# Patient Record
Sex: FEMALE | Race: OTHER | URBAN - METROPOLITAN AREA
[De-identification: names, ages, dates, MRNs, and addresses within clinical notes are randomized per-mention and may not be internally consistent; named-entity substitution may affect disease eponyms.]

---

## 2018-01-03 ENCOUNTER — TELEPHONE (OUTPATIENT)
Dept: FAMILY MEDICINE CLINIC | Facility: CLINIC | Age: 18
End: 2018-01-03

## 2018-01-03 NOTE — TELEPHONE ENCOUNTER
Father informed, asked for MMR and Tdap dates given  Father states he needs immunization dates entered on form, states he will drop off tomorrow.   Father given recommendations, explained that pt should have an appt to review what is being recommended,  Fat

## 2018-01-03 NOTE — TELEPHONE ENCOUNTER
I would recommend the patient have a second meningitis vaccine since her first 1 was given under age 10.   Also please find out if patient had the chickenpox disease as I do not see checkpox vaccine–if she has not had the chickenpox and I would recommend th

## 2018-01-03 NOTE — TELEPHONE ENCOUNTER
Is wondering what vaccines pt has had. Pt attends [employer] and wants to make sure she is up to date with all vaccines.

## 2018-01-04 NOTE — TELEPHONE ENCOUNTER
Ortega dropped off form, filled out what I could, patient is due for a second meveo in order for cristal to sign completed form.  Physical scheduled for Monday 1/8 to get 2nd menveo to get form filled out Expressed understanding     Your appointments     Date &

## 2018-01-04 NOTE — TELEPHONE ENCOUNTER
Spoke with Father (Ortega). He states the form is only for immunization history. Not a school physical. He will drop it off today for cristal to review.

## 2018-01-04 NOTE — TELEPHONE ENCOUNTER
Agree- patient  Likely needs an appointment -if her form is purely just her immunizations–it can be filled out. However, if it is a \"school physical\" she will need an appointment. Last physical was in 2015, last visit was in April 2016 for tonsillitis.

## 2018-01-08 ENCOUNTER — APPOINTMENT (OUTPATIENT)
Dept: LAB | Age: 18
End: 2018-01-08
Attending: NURSE PRACTITIONER
Payer: COMMERCIAL

## 2018-01-08 ENCOUNTER — OFFICE VISIT (OUTPATIENT)
Dept: FAMILY MEDICINE CLINIC | Facility: CLINIC | Age: 18
End: 2018-01-08

## 2018-01-08 VITALS
HEIGHT: 62.5 IN | RESPIRATION RATE: 14 BRPM | HEART RATE: 72 BPM | WEIGHT: 157.38 LBS | TEMPERATURE: 98 F | BODY MASS INDEX: 28.24 KG/M2 | DIASTOLIC BLOOD PRESSURE: 62 MMHG | SYSTOLIC BLOOD PRESSURE: 126 MMHG

## 2018-01-08 DIAGNOSIS — N94.6 DYSMENORRHEA: ICD-10-CM

## 2018-01-08 DIAGNOSIS — Z11.3 SCREENING FOR STD (SEXUALLY TRANSMITTED DISEASE): ICD-10-CM

## 2018-01-08 DIAGNOSIS — Z00.00 ENCOUNTER FOR HEALTH MAINTENANCE EXAMINATION IN ADULT: Primary | ICD-10-CM

## 2018-01-08 DIAGNOSIS — Z00.00 ENCOUNTER FOR HEALTH MAINTENANCE EXAMINATION IN ADULT: ICD-10-CM

## 2018-01-08 PROCEDURE — 87491 CHLMYD TRACH DNA AMP PROBE: CPT | Performed by: NURSE PRACTITIONER

## 2018-01-08 PROCEDURE — 99395 PREV VISIT EST AGE 18-39: CPT | Performed by: NURSE PRACTITIONER

## 2018-01-08 PROCEDURE — 86787 VARICELLA-ZOSTER ANTIBODY: CPT | Performed by: NURSE PRACTITIONER

## 2018-01-08 PROCEDURE — 87591 N.GONORRHOEAE DNA AMP PROB: CPT | Performed by: NURSE PRACTITIONER

## 2018-01-08 PROCEDURE — 36415 COLL VENOUS BLD VENIPUNCTURE: CPT | Performed by: NURSE PRACTITIONER

## 2018-01-08 RX ORDER — NORETHINDRONE ACETATE AND ETHINYL ESTRADIOL, AND FERROUS FUMARATE 1MG-20(24)
1 KIT ORAL DAILY
Qty: 28 CAPSULE | Refills: 12 | Status: SHIPPED | OUTPATIENT
Start: 2018-01-08 | End: 2019-05-01

## 2018-01-08 NOTE — PROGRESS NOTES
HPI:    Patient ID: Cindy Marquez is a 21year old female. HPI  Patient is here for a physical.  States she has a form for college to be filled out with her immunizations.   Required immunizations are complete, however patient does not have chickenpox v and ear canal normal.   Nose: Nose normal.   Mouth/Throat: Oropharynx is clear and moist and mucous membranes are normal. Normal dentition. No oropharyngeal exudate. Eyes: Conjunctivae and lids are normal. Pupils are equal, round, and reactive to light. ASSESSMENT/PLAN:   Screening for std (sexually transmitted disease)  Encounter for health maintenance examination in adult  (primary encounter diagnosis)  Dysmenorrhea      Orders Placed This Encounter      Varicella IgG ([employer] Titer)      Chlamydia/Gc

## 2018-01-08 NOTE — PATIENT INSTRUCTIONS
Recommend HPV vaccine-  Gardasil - 9   (series of 3 shots-  First, then in 2 months , then 6 months)-  Check on cost or if it is available at [facility] chickenpox titer today to check for immunity. - will hold form until results are back.     For [employer]

## 2018-01-09 LAB
C TRACH DNA SPEC QL NAA+PROBE: NEGATIVE
N GONORRHOEA DNA SPEC QL NAA+PROBE: NEGATIVE
VZV IGG SER IA-ACNC: POSITIVE

## 2018-01-10 ENCOUNTER — TELEPHONE (OUTPATIENT)
Dept: FAMILY MEDICINE CLINIC | Facility: CLINIC | Age: 18
End: 2018-01-10

## 2018-01-10 NOTE — TELEPHONE ENCOUNTER
----- Message from ARLENE Campbell sent at 1/9/2018 10:42 PM CST -----  Please notify patient that her chickenpox titer shows that she is immune to chickenpox–please give her a copy of  The lab report

## 2018-01-10 NOTE — TELEPHONE ENCOUNTER
----- Message from ARLENE Beltrán sent at 1/9/2018 10:40 PM CST -----  Please notify patient that gonorrhea and chlamydia is negative. Thank you.

## 2019-05-01 ENCOUNTER — OFFICE VISIT (OUTPATIENT)
Dept: FAMILY MEDICINE CLINIC | Facility: CLINIC | Age: 19
End: 2019-05-01
Payer: COMMERCIAL

## 2019-05-01 VITALS
TEMPERATURE: 97 F | SYSTOLIC BLOOD PRESSURE: 110 MMHG | DIASTOLIC BLOOD PRESSURE: 78 MMHG | HEART RATE: 80 BPM | WEIGHT: 190.25 LBS | RESPIRATION RATE: 16 BRPM | HEIGHT: 62.5 IN | BODY MASS INDEX: 34.14 KG/M2

## 2019-05-01 DIAGNOSIS — N94.6 DYSMENORRHEA: ICD-10-CM

## 2019-05-01 DIAGNOSIS — Z01.419 ENCOUNTER FOR GYNECOLOGICAL EXAMINATION: ICD-10-CM

## 2019-05-01 DIAGNOSIS — Z00.00 ENCOUNTER FOR HEALTH MAINTENANCE EXAMINATION IN ADULT: Primary | ICD-10-CM

## 2019-05-01 PROCEDURE — 88175 CYTOPATH C/V AUTO FLUID REDO: CPT | Performed by: NURSE PRACTITIONER

## 2019-05-01 PROCEDURE — 99395 PREV VISIT EST AGE 18-39: CPT | Performed by: NURSE PRACTITIONER

## 2019-05-01 RX ORDER — NORETHINDRONE ACETATE AND ETHINYL ESTRADIOL 1MG-20(21)
1 KIT ORAL DAILY
Qty: 3 PACKAGE | Refills: 12 | Status: SHIPPED | OUTPATIENT
Start: 2019-05-01 | End: 2020-03-25

## 2019-05-01 NOTE — PATIENT INSTRUCTIONS
Pap smear obtained today–results to be back within a week and will notify you. Recommend Gardasil 9 (HPV vaccine) series of 3 given first then 2 months in 6 months.     Recommend fasting blood work to [employer]  such as

## 2019-05-01 NOTE — PROGRESS NOTES
HPI:    Patient ID: Isidra Hatfield is a 22year old female.     HPI  Patient is here for Pap and physical.  Patient has never had a Pap smear in the past.  She denies any vaginal complaints–no itching, burning, discharge, etc.  She had STD testing last yea tracheal deviation present. No thyroid mass and no thyromegaly present. Cardiovascular: Normal rate, regular rhythm, S1 normal, S2 normal, normal heart sounds, intact distal pulses and normal pulses. No murmur heard.   Pulses:       Dorsalis pedis pulse thought content normal.   Nursing note and vitals reviewed.              ASSESSMENT/PLAN:   Encounter for health maintenance examination in adult  (primary encounter diagnosis)  Encounter for gynecological examination  Dysmenorrhea    Orders Placed This Enc

## 2019-05-06 ENCOUNTER — TELEPHONE (OUTPATIENT)
Dept: FAMILY MEDICINE CLINIC | Facility: CLINIC | Age: 19
End: 2019-05-06

## 2019-05-06 NOTE — TELEPHONE ENCOUNTER
----- Message from ESTELLE Dorman sent at 5/6/2019 12:01 PM CDT -----  Please notify patient that her Pap smear is within normal limits. Recommend annual physical, will discuss need for Pap at physical next year. Thank you.

## 2020-03-25 ENCOUNTER — TELEPHONE (OUTPATIENT)
Dept: FAMILY MEDICINE CLINIC | Facility: CLINIC | Age: 20
End: 2020-03-25

## 2020-03-25 PROCEDURE — 84439 ASSAY OF FREE THYROXINE: CPT | Performed by: FAMILY MEDICINE

## 2020-03-25 PROCEDURE — 80050 GENERAL HEALTH PANEL: CPT | Performed by: FAMILY MEDICINE

## 2020-03-25 PROCEDURE — 83550 IRON BINDING TEST: CPT | Performed by: FAMILY MEDICINE

## 2020-03-25 PROCEDURE — 83540 ASSAY OF IRON: CPT | Performed by: FAMILY MEDICINE

## 2020-03-25 NOTE — PATIENT INSTRUCTIONS
D/w pt reactive anxiety  D/w pt counceling- she can self refer to [address] [place] [address]    Pt given RX for long acting lexapro to take daily  And Xananx to take as needed-- I rec she take this evening to calm herself and help with sleep    Recheck 2-3 weeks  Pt

## 2020-03-25 NOTE — TELEPHONE ENCOUNTER
Patient states that she feels like she has shortness of breath, is not sure if it's due to her anxiety. Wants appointment for today.

## 2020-03-25 NOTE — PROGRESS NOTES
Kylah Miramontes is a 23year old female. Patient presents with: Anxiety: started Thursday Night      HPI:   Notes from phone call earlier today:  \"Patient feels like due to her anxiety she has been experiencing shortness of breath.  Having to take deep 34.45 kg/m². Current Outpatient Medications   Medication Sig Dispense Refill   • escitalopram (LEXAPRO) 10 MG Oral Tab Take 1 tablet (10 mg total) by mouth daily.  30 tablet 1   • ALPRAZolam (XANAX) 0.25 MG Oral Tab Take 1 tablet (0.25 mg total) by mo Future    2.  Amenorrhea  preg test negative, pt to monitor for next cycle  - URINE PREGNANCY TEST    Patient Instructions   D/w pt reactive anxiety  D/w pt counceling- she can self refer to [address] [place] [address]    Pt given RX for long acting lexapro to take da

## 2020-03-25 NOTE — TELEPHONE ENCOUNTER
Patient feels like due to her anxiety she has been experiencing shortness of breath. Having to take deep breaths to calm down. Has been feeling like this for four days and is worried it might become a panic attack.  The shortness of breath is also worsening

## 2020-03-26 ENCOUNTER — TELEPHONE (OUTPATIENT)
Dept: FAMILY MEDICINE CLINIC | Facility: CLINIC | Age: 20
End: 2020-03-26

## 2020-03-26 NOTE — TELEPHONE ENCOUNTER
----- Message from Cm Corado MD sent at 3/26/2020  7:43 AM CDT -----  Lab result reviewed  Chemistry panel normal  Thyroid function normal  Cbc-normal  Iron panel with borderlne deficiency- with normal iron, but elevated TIBC and low saturation --

## 2021-04-15 ENCOUNTER — OFFICE VISIT (OUTPATIENT)
Dept: FAMILY MEDICINE CLINIC | Facility: CLINIC | Age: 21
End: 2021-04-15
Payer: COMMERCIAL

## 2021-04-15 VITALS
HEART RATE: 98 BPM | DIASTOLIC BLOOD PRESSURE: 72 MMHG | SYSTOLIC BLOOD PRESSURE: 124 MMHG | RESPIRATION RATE: 14 BRPM | TEMPERATURE: 98 F | OXYGEN SATURATION: 98 %

## 2021-04-15 DIAGNOSIS — R05.9 COUGH: ICD-10-CM

## 2021-04-15 DIAGNOSIS — R09.89 RUNNY NOSE: Primary | ICD-10-CM

## 2021-04-15 DIAGNOSIS — J02.9 SORE THROAT: ICD-10-CM

## 2021-04-15 PROCEDURE — 3078F DIAST BP <80 MM HG: CPT | Performed by: NURSE PRACTITIONER

## 2021-04-15 PROCEDURE — 3074F SYST BP LT 130 MM HG: CPT | Performed by: NURSE PRACTITIONER

## 2021-04-15 PROCEDURE — 99213 OFFICE O/P EST LOW 20 MIN: CPT | Performed by: NURSE PRACTITIONER

## 2021-04-15 RX ORDER — BENZONATATE 100 MG/1
100 CAPSULE ORAL 3 TIMES DAILY PRN
Qty: 15 CAPSULE | Refills: 0 | Status: SHIPPED | OUTPATIENT
Start: 2021-04-15

## 2021-04-15 NOTE — PATIENT INSTRUCTIONS
covid swab today. Start zyrtec once a day. May take benadryl 50mg at night.   Tessalon pearles one every 8 hours as needed for cough  Fluticasone (flonase) two sprays each nostril once a day for a week then one spray each nostril daily for an additional w

## 2021-04-15 NOTE — PROGRESS NOTES
CHIEF COMPLAINT:   Patient presents with:  Sore Throat: Itchy throat that makes her cough.  Started around Sunday      HPI:   Elvie Diaz is a 19year old female who presents to [facility] today with complaints of itchy throat, started 04 tingling in face. See HPI    EXAM:   /72 (BP Location: Right arm, Patient Position: Sitting)   Pulse 98   Temp 98.3 °F (36.8 °C)   Resp 14   LMP 04/10/2021   SpO2 98%   No height and weight on file for this encounter.     GENERAL: well developed, well once a day. May take benadryl 50mg at night.   Tessalon pearles one every 8 hours as needed for cough  Fluticasone (flonase) two sprays each nostril once a day for a week then one spray each nostril daily for an additional week  Lukewarm salt water gargles

## 2021-04-16 ENCOUNTER — TELEPHONE (OUTPATIENT)
Dept: FAMILY MEDICINE CLINIC | Facility: CLINIC | Age: 21
End: 2021-04-16

## 2021-04-16 NOTE — TELEPHONE ENCOUNTER
----- Message from ESTELLE Marcano sent at 4/16/2021 10:41 AM CDT -----  Patient's covid test is negative. Continue treatment as outlined at visit, follow up if no resolution or symptoms worsen.

## 2021-08-30 ENCOUNTER — OFFICE VISIT (OUTPATIENT)
Dept: FAMILY MEDICINE CLINIC | Facility: CLINIC | Age: 21
End: 2021-08-30
Payer: COMMERCIAL

## 2021-08-30 ENCOUNTER — TELEPHONE (OUTPATIENT)
Dept: FAMILY MEDICINE CLINIC | Facility: CLINIC | Age: 21
End: 2021-08-30

## 2021-08-30 ENCOUNTER — HOSPITAL ENCOUNTER (OUTPATIENT)
Dept: GENERAL RADIOLOGY | Age: 21
Discharge: HOME | End: 2021-08-30
Attending: NURSE PRACTITIONER
Payer: COMMERCIAL

## 2021-08-30 VITALS
BODY MASS INDEX: 33.05 KG/M2 | TEMPERATURE: 98 F | HEART RATE: 118 BPM | HEIGHT: 62.5 IN | WEIGHT: 184.19 LBS | DIASTOLIC BLOOD PRESSURE: 76 MMHG | RESPIRATION RATE: 16 BRPM | SYSTOLIC BLOOD PRESSURE: 126 MMHG | OXYGEN SATURATION: 95 %

## 2021-08-30 DIAGNOSIS — R30.9 PAINFUL URINATION: Primary | ICD-10-CM

## 2021-08-30 DIAGNOSIS — N30.01 ACUTE CYSTITIS WITH HEMATURIA: ICD-10-CM

## 2021-08-30 DIAGNOSIS — R06.02 SHORTNESS OF BREATH: ICD-10-CM

## 2021-08-30 LAB
BILIRUB UR QL STRIP.AUTO: NEGATIVE
GLUCOSE (URINE DIPSTICK): 100 MG/DL
GLUCOSE UR STRIP.AUTO-MCNC: NEGATIVE MG/DL
KETONES (URINE DIPSTICK): NEGATIVE MG/DL
KETONES UR STRIP.AUTO-MCNC: NEGATIVE MG/DL
MULTISTIX LOT#: 5077 NUMERIC
NITRITE UR QL STRIP.AUTO: POSITIVE
NITRITE, URINE: POSITIVE
PH UR STRIP.AUTO: 5 [PH] (ref 5–8)
PH, URINE: 5.5 (ref 4.5–8)
PROT UR STRIP.AUTO-MCNC: NEGATIVE MG/DL
SP GR UR STRIP.AUTO: 1.02 (ref 1–1.03)
SPECIFIC GRAVITY: 1.02 (ref 1–1.03)
UROBILINOGEN UR STRIP.AUTO-MCNC: 2 MG/DL
UROBILINOGEN,SEMI-QN: 1 MG/DL (ref 0–1.9)

## 2021-08-30 PROCEDURE — 3074F SYST BP LT 130 MM HG: CPT | Performed by: NURSE PRACTITIONER

## 2021-08-30 PROCEDURE — 81001 URINALYSIS AUTO W/SCOPE: CPT | Performed by: NURSE PRACTITIONER

## 2021-08-30 PROCEDURE — 3078F DIAST BP <80 MM HG: CPT | Performed by: NURSE PRACTITIONER

## 2021-08-30 PROCEDURE — 87086 URINE CULTURE/COLONY COUNT: CPT | Performed by: NURSE PRACTITIONER

## 2021-08-30 PROCEDURE — 71046 X-RAY EXAM CHEST 2 VIEWS: CPT | Performed by: NURSE PRACTITIONER

## 2021-08-30 PROCEDURE — 81003 URINALYSIS AUTO W/O SCOPE: CPT | Performed by: NURSE PRACTITIONER

## 2021-08-30 PROCEDURE — 99214 OFFICE O/P EST MOD 30 MIN: CPT | Performed by: NURSE PRACTITIONER

## 2021-08-30 PROCEDURE — 3008F BODY MASS INDEX DOCD: CPT | Performed by: NURSE PRACTITIONER

## 2021-08-30 RX ORDER — NITROFURANTOIN 25; 75 MG/1; MG/1
100 CAPSULE ORAL 2 TIMES DAILY
Qty: 20 CAPSULE | Refills: 0 | Status: SHIPPED | OUTPATIENT
Start: 2021-08-30 | End: 2021-09-09

## 2021-08-30 NOTE — TELEPHONE ENCOUNTER
----- Message from ESTELLE Swanson sent at 8/30/2021  1:53 PM CDT -----  Your chest x-ray is normal.  Follow-up if symptoms persist or increase. Thank ESTELLE Gunn, FNP-BC

## 2021-08-30 NOTE — PROGRESS NOTES
Patient ID:   Curtis Manriquez  is a 19year old female    Allergies  No Known Allergies  Medications   nitrofurantoin monohydrate macro 100 MG Oral Cap, Take 1 capsule (100 mg total) by mouth 2 (two) times daily for 10 days. , Disp: 20 capsule, Rfl: 0 rate and rhythm no murmur. Pulmonary: Clear to auscultation bilaterally   Abominal: Soft. Nontender, no guarding, no rebound, no masses. No hepatosplenomegaly. No costovertebral angle tenderness, some pressure to suprapubic area with palpation.   Skin:

## 2021-11-29 ENCOUNTER — HOSPITAL ENCOUNTER (OUTPATIENT)
Age: 21
Discharge: HOME | End: 2021-11-29
Payer: COMMERCIAL

## 2021-11-29 ENCOUNTER — TELEPHONE (OUTPATIENT)
Dept: FAMILY MEDICINE CLINIC | Facility: CLINIC | Age: 21
End: 2021-11-29

## 2021-11-29 VITALS
RESPIRATION RATE: 18 BRPM | TEMPERATURE: 98 F | OXYGEN SATURATION: 98 % | HEIGHT: 62 IN | SYSTOLIC BLOOD PRESSURE: 133 MMHG | WEIGHT: 185 LBS | DIASTOLIC BLOOD PRESSURE: 93 MMHG | HEART RATE: 104 BPM | BODY MASS INDEX: 34.04 KG/M2

## 2021-11-29 DIAGNOSIS — B34.9 VIRAL SYNDROME: Primary | ICD-10-CM

## 2021-11-29 PROCEDURE — 99213 OFFICE O/P EST LOW 20 MIN: CPT | Performed by: PHYSICIAN ASSISTANT

## 2021-11-29 PROCEDURE — U0002 COVID-19 LAB TEST NON-CDC: HCPCS | Performed by: PHYSICIAN ASSISTANT

## 2021-11-29 PROCEDURE — 87880 STREP A ASSAY W/OPTIC: CPT | Performed by: PHYSICIAN ASSISTANT

## 2021-11-29 NOTE — ED INITIAL ASSESSMENT (HPI)
Since Wednesday, c/o sore throat and fever. Throat feels itchy and dry. Has a dry cough with mild sob.

## 2021-11-29 NOTE — ED PROVIDER NOTES
Patient Seen in: [facility] [address] [place] [address]      History   Patient presents with:  Sore Throat: Entered by patient    Stated Complaint: Sore Throat x1 week    Subjective:   HPI    77-year-old female presents to the [address] [place] [address] for evaluation of sore throat, normal.      Breath sounds: Normal breath sounds. Musculoskeletal:      Cervical back: Normal range of motion and neck supple. Skin:     General: Skin is warm and dry. Capillary Refill: Capillary refill takes less than 2 seconds.    Neurological:

## 2021-11-29 NOTE — TELEPHONE ENCOUNTER
Pt is calling today says that she developed a sore throat about Wednesday last week. Wednesday she had a fever of 102.7, she took advil. She still has a itchy throat, fever has been down, has issues swallowing makes her want to cough.     We have no appt

## 2022-06-27 ENCOUNTER — TELEPHONE (OUTPATIENT)
Dept: FAMILY MEDICINE CLINIC | Facility: CLINIC | Age: 22
End: 2022-06-27

## 2022-06-27 NOTE — TELEPHONE ENCOUNTER
Pt calling and states she has appt coming up with Kyler Cox but wants to come in sooner due to worsening ABD pain and vaginal bleeding two weeks after her period. Call sent to nurse.

## 2022-06-27 NOTE — TELEPHONE ENCOUNTER
Please advise-    Pt is calling today because over the weekend she started having very bad cramping. States that her last menstrual cycle around the 14/15th of June. She started having cramping that started Friday 6/24 By Saturday 6/25 they were continuing with no bleeding. She took Advil for relief- states that they are continuous. States that Sunday she started bleeding- very dark with clots. Going through a super tampon in about an hour. The bleeding is continuing today- states that she is wearing a pad to see if she can tell how much she is bleeding today- still having cramping.

## 2022-06-27 NOTE — TELEPHONE ENCOUNTER
Patient encouraged to hydrate and rest.  Okay for use of ibuprofen 4 to 600 mg of Advil every 6 hours as needed for cramping. Patient to use pads. If changing pad as frequently as every hour then emergency room visit is advised.       Future Appointments   Date Time Provider Shaun Tsai   6/30/2022 10:00 AM ESTELLE Enriquez [facility] [place] [facility]

## 2022-06-28 ENCOUNTER — TELEPHONE (OUTPATIENT)
Dept: FAMILY MEDICINE CLINIC | Facility: CLINIC | Age: 22
End: 2022-06-28

## 2022-06-28 ENCOUNTER — OFFICE VISIT (OUTPATIENT)
Dept: FAMILY MEDICINE CLINIC | Facility: CLINIC | Age: 22
End: 2022-06-28
Payer: COMMERCIAL

## 2022-06-28 ENCOUNTER — LAB ENCOUNTER (OUTPATIENT)
Dept: LAB | Age: 22
End: 2022-06-28
Attending: NURSE PRACTITIONER
Payer: COMMERCIAL

## 2022-06-28 VITALS
DIASTOLIC BLOOD PRESSURE: 60 MMHG | RESPIRATION RATE: 16 BRPM | OXYGEN SATURATION: 100 % | BODY MASS INDEX: 35.15 KG/M2 | HEIGHT: 62 IN | WEIGHT: 191 LBS | HEART RATE: 104 BPM | SYSTOLIC BLOOD PRESSURE: 100 MMHG | TEMPERATURE: 98 F

## 2022-06-28 DIAGNOSIS — N94.6 DYSMENORRHEA: ICD-10-CM

## 2022-06-28 DIAGNOSIS — N92.6 IRREGULAR MENSES: Primary | ICD-10-CM

## 2022-06-28 DIAGNOSIS — N92.6 IRREGULAR MENSES: ICD-10-CM

## 2022-06-28 DIAGNOSIS — R10.2 PELVIC PAIN: ICD-10-CM

## 2022-06-28 LAB
B-HCG SERPL-ACNC: <1 MIU/ML
BASOPHILS # BLD AUTO: 0.02 X10(3) UL (ref 0–0.2)
BASOPHILS NFR BLD AUTO: 0.3 %
CONTROL LINE PRESENT WITH A CLEAR BACKGROUND (YES/NO): YES YES/NO
EOSINOPHIL # BLD AUTO: 0.03 X10(3) UL (ref 0–0.7)
EOSINOPHIL NFR BLD AUTO: 0.4 %
ERYTHROCYTE [DISTWIDTH] IN BLOOD BY AUTOMATED COUNT: 14.6 %
HCT VFR BLD AUTO: 36.4 %
HGB BLD-MCNC: 11.4 G/DL
IMM GRANULOCYTES # BLD AUTO: 0.02 X10(3) UL (ref 0–1)
IMM GRANULOCYTES NFR BLD: 0.3 %
LYMPHOCYTES # BLD AUTO: 1.25 X10(3) UL (ref 1–4)
LYMPHOCYTES NFR BLD AUTO: 18 %
MCH RBC QN AUTO: 27.3 PG (ref 26–34)
MCHC RBC AUTO-ENTMCNC: 31.3 G/DL (ref 31–37)
MCV RBC AUTO: 87.1 FL
MONOCYTES # BLD AUTO: 0.62 X10(3) UL (ref 0.1–1)
MONOCYTES NFR BLD AUTO: 8.9 %
NEUTROPHILS # BLD AUTO: 4.99 X10 (3) UL (ref 1.5–7.7)
NEUTROPHILS # BLD AUTO: 4.99 X10(3) UL (ref 1.5–7.7)
NEUTROPHILS NFR BLD AUTO: 72.1 %
PLATELET # BLD AUTO: 239 10(3)UL (ref 150–450)
RBC # BLD AUTO: 4.18 X10(6)UL
TSI SER-ACNC: 2.58 MIU/ML (ref 0.36–3.74)
WBC # BLD AUTO: 6.9 X10(3) UL (ref 4–11)

## 2022-06-28 PROCEDURE — 84443 ASSAY THYROID STIM HORMONE: CPT | Performed by: NURSE PRACTITIONER

## 2022-06-28 PROCEDURE — 81025 URINE PREGNANCY TEST: CPT | Performed by: NURSE PRACTITIONER

## 2022-06-28 PROCEDURE — 3074F SYST BP LT 130 MM HG: CPT | Performed by: NURSE PRACTITIONER

## 2022-06-28 PROCEDURE — 3008F BODY MASS INDEX DOCD: CPT | Performed by: NURSE PRACTITIONER

## 2022-06-28 PROCEDURE — 85025 COMPLETE CBC W/AUTO DIFF WBC: CPT | Performed by: NURSE PRACTITIONER

## 2022-06-28 PROCEDURE — 84702 CHORIONIC GONADOTROPIN TEST: CPT | Performed by: NURSE PRACTITIONER

## 2022-06-28 PROCEDURE — 99214 OFFICE O/P EST MOD 30 MIN: CPT | Performed by: NURSE PRACTITIONER

## 2022-06-28 PROCEDURE — 3078F DIAST BP <80 MM HG: CPT | Performed by: NURSE PRACTITIONER

## 2022-06-28 NOTE — PATIENT INSTRUCTIONS
Urine Pregnancy test is negative    Will check blood test for pregnancy- also blood count, metabolic, and thyroid    Ultrasound at [facility] today

## 2022-06-29 ENCOUNTER — TELEPHONE (OUTPATIENT)
Dept: FAMILY MEDICINE CLINIC | Facility: CLINIC | Age: 22
End: 2022-06-29

## 2022-06-29 NOTE — TELEPHONE ENCOUNTER
[employer] message sent as Shayy Alvarez,   Very pelvic ultrasound did show an ovarian cyst on your right ovary. I would recommend that you continue with ibuprofen and a heating pad as needed for pain. If your pain becomes severe go to the emergency room. I would recommend that you follow-up for a repeat ultrasound after your next menstrual period. If your pain is persisting I would recommend that you follow-up with OB/GYN for further evaluation.   Thank you,  ESTELLE Berkowitz, [facility]

## 2022-06-29 NOTE — TELEPHONE ENCOUNTER
----- Message from ESTELLE Crystal sent at 6/29/2022  9:22 AM CDT -----  Please make sure patient receives MyChart messages below-Your blood work shows a negative pregnancy test, normal thyroid, normal metabolic panel (liver and kidney function, electrolytes, blood sugar), and your CBC shows your hemoglobin is slightly low-this may be due to the heavy bleeding would recommend that he take an iron supplement over-the-counter  See also other result note regarding your ultrasound. Thank ESTELLE Mackenzie, FNP-BC